# Patient Record
Sex: FEMALE | Race: OTHER | URBAN - METROPOLITAN AREA
[De-identification: names, ages, dates, MRNs, and addresses within clinical notes are randomized per-mention and may not be internally consistent; named-entity substitution may affect disease eponyms.]

---

## 2020-02-04 ENCOUNTER — EMERGENCY (EMERGENCY)
Facility: HOSPITAL | Age: 30
LOS: 1 days | Discharge: HOME | End: 2020-02-04
Attending: EMERGENCY MEDICINE | Admitting: EMERGENCY MEDICINE
Payer: COMMERCIAL

## 2020-02-04 VITALS
HEIGHT: 67 IN | DIASTOLIC BLOOD PRESSURE: 62 MMHG | WEIGHT: 134.92 LBS | HEART RATE: 111 BPM | TEMPERATURE: 98 F | SYSTOLIC BLOOD PRESSURE: 105 MMHG | RESPIRATION RATE: 18 BRPM

## 2020-02-04 VITALS
HEART RATE: 79 BPM | SYSTOLIC BLOOD PRESSURE: 109 MMHG | DIASTOLIC BLOOD PRESSURE: 65 MMHG | TEMPERATURE: 98 F | RESPIRATION RATE: 18 BRPM

## 2020-02-04 LAB
ALBUMIN SERPL ELPH-MCNC: 4.4 G/DL — SIGNIFICANT CHANGE UP (ref 3.3–5)
ALP SERPL-CCNC: 118 U/L — SIGNIFICANT CHANGE UP (ref 40–120)
ALT FLD-CCNC: 13 U/L — SIGNIFICANT CHANGE UP (ref 10–45)
ANION GAP SERPL CALC-SCNC: 14 MMOL/L — SIGNIFICANT CHANGE UP (ref 5–17)
APPEARANCE UR: CLEAR — SIGNIFICANT CHANGE UP
AST SERPL-CCNC: 15 U/L — SIGNIFICANT CHANGE UP (ref 10–40)
BASOPHILS # BLD AUTO: 0.04 K/UL — SIGNIFICANT CHANGE UP (ref 0–0.2)
BASOPHILS NFR BLD AUTO: 0.4 % — SIGNIFICANT CHANGE UP (ref 0–2)
BILIRUB SERPL-MCNC: 0.4 MG/DL — SIGNIFICANT CHANGE UP (ref 0.2–1.2)
BILIRUB UR-MCNC: NEGATIVE — SIGNIFICANT CHANGE UP
BUN SERPL-MCNC: 7 MG/DL — SIGNIFICANT CHANGE UP (ref 7–23)
CALCIUM SERPL-MCNC: 9.9 MG/DL — SIGNIFICANT CHANGE UP (ref 8.4–10.5)
CHLORIDE SERPL-SCNC: 102 MMOL/L — SIGNIFICANT CHANGE UP (ref 96–108)
CO2 SERPL-SCNC: 26 MMOL/L — SIGNIFICANT CHANGE UP (ref 22–31)
COLOR SPEC: YELLOW — SIGNIFICANT CHANGE UP
CREAT SERPL-MCNC: 0.52 MG/DL — SIGNIFICANT CHANGE UP (ref 0.5–1.3)
DIFF PNL FLD: ABNORMAL
EOSINOPHIL # BLD AUTO: 0.04 K/UL — SIGNIFICANT CHANGE UP (ref 0–0.5)
EOSINOPHIL NFR BLD AUTO: 0.4 % — SIGNIFICANT CHANGE UP (ref 0–6)
FLU A RESULT: SIGNIFICANT CHANGE UP
FLU A RESULT: SIGNIFICANT CHANGE UP
FLUAV AG NPH QL: SIGNIFICANT CHANGE UP
FLUBV AG NPH QL: SIGNIFICANT CHANGE UP
GLUCOSE SERPL-MCNC: 89 MG/DL — SIGNIFICANT CHANGE UP (ref 70–99)
GLUCOSE UR QL: NEGATIVE — SIGNIFICANT CHANGE UP
HCT VFR BLD CALC: 41.6 % — SIGNIFICANT CHANGE UP (ref 34.5–45)
HGB BLD-MCNC: 13.8 G/DL — SIGNIFICANT CHANGE UP (ref 11.5–15.5)
IMM GRANULOCYTES NFR BLD AUTO: 0.3 % — SIGNIFICANT CHANGE UP (ref 0–1.5)
KETONES UR-MCNC: ABNORMAL MG/DL
LEUKOCYTE ESTERASE UR-ACNC: NEGATIVE — SIGNIFICANT CHANGE UP
LYMPHOCYTES # BLD AUTO: 1.86 K/UL — SIGNIFICANT CHANGE UP (ref 1–3.3)
LYMPHOCYTES # BLD AUTO: 19.9 % — SIGNIFICANT CHANGE UP (ref 13–44)
MCHC RBC-ENTMCNC: 30.7 PG — SIGNIFICANT CHANGE UP (ref 27–34)
MCHC RBC-ENTMCNC: 33.2 GM/DL — SIGNIFICANT CHANGE UP (ref 32–36)
MCV RBC AUTO: 92.4 FL — SIGNIFICANT CHANGE UP (ref 80–100)
MONOCYTES # BLD AUTO: 0.59 K/UL — SIGNIFICANT CHANGE UP (ref 0–0.9)
MONOCYTES NFR BLD AUTO: 6.3 % — SIGNIFICANT CHANGE UP (ref 2–14)
NEUTROPHILS # BLD AUTO: 6.78 K/UL — SIGNIFICANT CHANGE UP (ref 1.8–7.4)
NEUTROPHILS NFR BLD AUTO: 72.7 % — SIGNIFICANT CHANGE UP (ref 43–77)
NITRITE UR-MCNC: NEGATIVE — SIGNIFICANT CHANGE UP
NRBC # BLD: 0 /100 WBCS — SIGNIFICANT CHANGE UP (ref 0–0)
PH UR: 6 — SIGNIFICANT CHANGE UP (ref 5–8)
PLATELET # BLD AUTO: 448 K/UL — HIGH (ref 150–400)
POTASSIUM SERPL-MCNC: 4.8 MMOL/L — SIGNIFICANT CHANGE UP (ref 3.5–5.3)
POTASSIUM SERPL-SCNC: 4.8 MMOL/L — SIGNIFICANT CHANGE UP (ref 3.5–5.3)
PROT SERPL-MCNC: 8.2 G/DL — SIGNIFICANT CHANGE UP (ref 6–8.3)
PROT UR-MCNC: ABNORMAL MG/DL
RBC # BLD: 4.5 M/UL — SIGNIFICANT CHANGE UP (ref 3.8–5.2)
RBC # FLD: 11.8 % — SIGNIFICANT CHANGE UP (ref 10.3–14.5)
RSV RESULT: DETECTED
RSV RNA RESP QL NAA+PROBE: DETECTED
SODIUM SERPL-SCNC: 142 MMOL/L — SIGNIFICANT CHANGE UP (ref 135–145)
SP GR SPEC: 1.02 — SIGNIFICANT CHANGE UP (ref 1–1.03)
UROBILINOGEN FLD QL: 1 E.U./DL — SIGNIFICANT CHANGE UP
WBC # BLD: 9.34 K/UL — SIGNIFICANT CHANGE UP (ref 3.8–10.5)
WBC # FLD AUTO: 9.34 K/UL — SIGNIFICANT CHANGE UP (ref 3.8–10.5)

## 2020-02-04 PROCEDURE — 99284 EMERGENCY DEPT VISIT MOD MDM: CPT | Mod: 25

## 2020-02-04 PROCEDURE — 96374 THER/PROPH/DIAG INJ IV PUSH: CPT

## 2020-02-04 PROCEDURE — 85025 COMPLETE CBC W/AUTO DIFF WBC: CPT

## 2020-02-04 PROCEDURE — 71046 X-RAY EXAM CHEST 2 VIEWS: CPT

## 2020-02-04 PROCEDURE — 87631 RESP VIRUS 3-5 TARGETS: CPT

## 2020-02-04 PROCEDURE — 81001 URINALYSIS AUTO W/SCOPE: CPT

## 2020-02-04 PROCEDURE — 80053 COMPREHEN METABOLIC PANEL: CPT

## 2020-02-04 PROCEDURE — 71046 X-RAY EXAM CHEST 2 VIEWS: CPT | Mod: 26

## 2020-02-04 RX ORDER — KETOROLAC TROMETHAMINE 30 MG/ML
15 SYRINGE (ML) INJECTION ONCE
Refills: 0 | Status: DISCONTINUED | OUTPATIENT
Start: 2020-02-04 | End: 2020-02-04

## 2020-02-04 RX ORDER — SODIUM CHLORIDE 9 MG/ML
1000 INJECTION INTRAMUSCULAR; INTRAVENOUS; SUBCUTANEOUS ONCE
Refills: 0 | Status: COMPLETED | OUTPATIENT
Start: 2020-02-04 | End: 2020-02-04

## 2020-02-04 RX ADMIN — Medication 15 MILLIGRAM(S): at 13:18

## 2020-02-04 RX ADMIN — SODIUM CHLORIDE 1000 MILLILITER(S): 9 INJECTION INTRAMUSCULAR; INTRAVENOUS; SUBCUTANEOUS at 13:20

## 2020-02-04 NOTE — ED ADULT NURSE NOTE - PMH
Injectable Influenza Immunization Documentation    1.  Is the person to be vaccinated sick today?   No    2. Does the person to be vaccinated have an allergy to a component   of the vaccine?   No  Egg Allergy Algorithm Link    3. Has the person to be vaccinated ever had a serious reaction   to influenza vaccine in the past?   No    4. Has the person to be vaccinated ever had Guillain-Barré syndrome?   No    Form completed by Sudhir Agarwal ma           
No pertinent past medical history

## 2020-02-04 NOTE — ED PROVIDER NOTE - PATIENT PORTAL LINK FT
You can access the FollowMyHealth Patient Portal offered by [facility] by registering at the following website: [email or web address]. By joining [facility]’s FollowMyHealth portal, you will also be able to view your health information using other applications (apps) compatible with our system.

## 2020-02-04 NOTE — ED ADULT TRIAGE NOTE - OTHER COMPLAINTS
Pt presents with complaints of cough, sob, migraines x 3 weeks. Pt states "I took a z pack and it has not helped". Pt endorses dark colored urine yesterday and is "concerned for my liver because I take acutane". Pt denies any fevers, no lightheadedness, no dizziness, no cp, no sob at this time, no n/v, no urinary complaints, no vaginal bleeding

## 2020-02-04 NOTE — ED ADULT NURSE NOTE - NSIMPLEMENTINTERV_GEN_ALL_ED
Implemented All Universal Safety Interventions:  [place] to call system. Call bell, personal items and telephone within reach. Instruct patient to call for assistance. Room bathroom lighting operational. Non-slip footwear when patient is off stretcher. Physically safe environment: no spills, clutter or unnecessary equipment. Stretcher in lowest position, wheels locked, appropriate side rails in place.

## 2020-02-04 NOTE — ED PROVIDER NOTE - CLINICAL SUMMARY MEDICAL DECISION MAKING FREE TEXT BOX
30 y/o healthy F presents ot the ED c/o 2 weeks of cough, congestion, fatigue, body aches, fever and chills; flu negative, + RSV.  CXR no infiltrate/effusion.  given IVF, toradol with improvement.  educated on supportive measures and d/c in stable condition discussed strict return parameters.  d/w attending

## 2020-02-04 NOTE — ED PROVIDER NOTE - ATTENDING CONTRIBUTION TO CARE
29F no PMH p/w several complaints. States that she has 3w of cough, fevers. Was in UC ~5d ago and prescribed z-pack. States that yesterday urine was darker but is normal now. She had a break of a few days w/o fevers but then they returned. Occasional nausea. Denies HA, SOB/CP, vomiting, diarrhea, abd pain, dysuria, rashes, focal weakness/numbness, LE pain/swelling, weight loss. Mild tachycardia, other vitals wnl. Exam as above.  In ED: Labs grossly wnl, RSV+, UA no infection, CXR no acute pathology.  Given IVF/symptom control. HR improved. Pt now feeling much better.  ddx: Likely URI.  Clinically no indication for further emergent ED workup or hospitalization at this time. Comfortable for dc, outpt f/u - discussed importance of f/u, especially given long duration of symptoms.

## 2020-02-04 NOTE — ED PROVIDER NOTE - NSFOLLOWUPINSTRUCTIONS_ED_ALL_ED_FT
Continue to hydrate with plenty of fluids.  Take tesslon tabs for cough and continue albuterol inhaler prescribed by urgent care.  Follow up with primary doctor within one week.  Return to ED for persistent fevers, difficulty breathing, chest pain, dizziness, fainting      Respiratory Syncytial Virus, Adult     Respiratory syncytial virus (RSV) is a common viral infection. It is caused by a virus that is similar to viruses that cause cold and flu symptoms. RSV can affect the nose, throat, and upper air passages (upper respiratory system) and the windpipe and lungs (lower respiratory system). If RSV affects the air passages in your lungs, you have bronchiolitis. If RSV affects your lungs, you have pneumonia.  RSV spreads from person to person (is contagious) through droplets from coughs and sneezes (respiratory secretions). RSV is rarely serious when it occurs in adults.  What are the causes?  An RSV infection is caused by the respiratory syncytial virus. When a sick person coughs or sneezes, there are particles of the virus in the droplets. You can get sick if you breathe in (inhale) those droplets. The virus can also survive for a while in droplets that land on surfaces. If you touch a contaminated surface and then touch your face, the virus can enter your body through your mouth, nose, or eyes. The virus also spreads through kissing, close contact, and shared eating or drinking utensils.  What increases the risk?  You may have a higher risk for RSV infection if:  You are 65 or older.You have a long-term (chronic) lung condition, such as COPD.You have a weakened disease-fighting system (immune system).You have Down syndrome.You have heart disease.You work in a hospital or other health care facility.You live in a long-term health care facility.What are the signs or symptoms?  Symptoms of RSV include:  Runny nose.Coughing. You may have a cough that brings up mucus (productive cough).Sneezing.Fever.Decreased appetite.Breathing loudly (wheezing).Shortness of breath.Fluid buildup in the lungs (respiratory distress).How is this diagnosed?  This condition may be diagnosed based on:  Your symptoms.Your medical history.A physical exam.A chest X-ray to rule out pneumonia.Blood tests or tests of mucus from your lungs (sputum). These tests may be done if you are older.A test of your respiratory secretions.How is this treated?  In most cases, the RSV infection will go away after 1–2 weeks of caring for yourself at home. If you have severe RSV and you develop pneumonia, you may need to be treated in the hospital with oxygen, antibiotic medicine, and medicines to open your breathing tubes (bronchodilators).  Follow these instructions at home:  Take over-the-counter and prescription medicines only as told by your health care provider.Drink enough fluid to keep your urine clear or pale yellow.Rest at home until your symptoms go away.Eat a healthy diet.Do not use any products that contain nicotine or tobacco, such as cigarettes and e-cigarettes. If you need help quitting, ask your health care provider.Keep all follow-up visits as told by your health care provider. This is important.How is this prevented?  To prevent catching and spreading the RSV virus:  Wash your hands often with soap and water. If soap and water are not available, use an alcohol-based hand . If you have not cleaned your hands, do not touch your face.If you have cold-like or flu-like symptoms, stay home.Cover your nose and mouth when you cough or sneeze.Avoid large groups of people.Keep a safe distance from people who are coughing and sneezing.Contact a health care provider if:  Your symptoms get worse.Your symptoms have not improved after 2 weeks.You have a fever.You have continuing (persistent) sweatiness, hot flashes, or chills.You cough up much more mucus than usual.You cough up blood.You feel very tired (are lethargic).You become confused.You have respiratory distress that gets worse.This information is not intended to replace advice given to you by your health care provider. Make sure you discuss any questions you have with your health care provider.

## 2020-02-04 NOTE — ED PROVIDER NOTE - OBJECTIVE STATEMENT
28 y/o healthy F presents ot the ED c/o 2 weeks of cough, congestion, fatigue, body aches, fever and chills. Pt went to Urgent Care on Thursday and was prescribed a Z-boone for bronchitis; reports completing her medication yesterday, but feels much worse. Pt states her dry cough is worsening with SOB, and is now experiencing dark brown urine, though denies dysuria. Pt takes Accutane and is mainly concerned of her liver function due to taking erythromycin. Pt is currently visiting from [place], otherwise denies recent travel. Denies leg pain/swelling, chest pain, abdominal pain, nausea or vomiting. 30 y/o healthy F presents ot the ED c/o 2 weeks of cough, congestion, fatigue, body aches, fever and chills. Pt went to Urgent Care on Thursday and was prescribed a Z-boone for bronchitis; reports completing her medication yesterday without relief. Pt states her dry cough is worsening with SOB w/ coughing, and is now experiencing dark brown urine, though denies dysuria. Pt takes Accutane and is mainly concerned of her liver function due to taking azithromycin.  Pt is currently visiting from [place], otherwise denies recent travel. Denies leg pain/swelling, chest pain, abdominal pain, nausea or vomiting.

## 2020-02-04 NOTE — ED PROVIDER NOTE - DIAGNOSTIC INTERPRETATION
ER PA: Laura Ballesteros  CHEST XRAY INTERPRETATION: lungs clear, heart shadow normal, bony structures intact

## 2020-02-04 NOTE — ED ADULT NURSE NOTE - CHIEF COMPLAINT QUOTE
Detail Level: Zone
cough, intermittent shortness of breath x 3 weeks.
Additional Note: Patient is reassured regarding the benign nature of this/these lesion(s).  Patient is encouraged to return for evaluation if lesion(s) grow, change, or becomes symptomatic.\\n
Include Location In Plan?: No

## 2020-02-04 NOTE — ED PROVIDER NOTE - PHYSICAL EXAMINATION
Vitals reviewed  Gen: fatigue appearing, nad, speaking in full sentences  Skin: wwp   HEENT: ncat, eomi, mmm, + nasal congestion, +post nasal drip, uvula is midline, no tonsillar erythema or exudates  CV: tachy, but regular rhythm, no audible m/r/g  Resp: ctab, no w/r/r  Abd: soft/nt  Ext: FROM throughout, no peripheral edema, no calf tenderness  Neuro: alert/oriented, no focal deficits, steady gait

## 2020-02-04 NOTE — ED ADULT NURSE NOTE - OBJECTIVE STATEMENT
PT presents complaining of body aches, congestion, cough for 2 weeks. Currently taking accutaine, given z pack and inhaler on thursday at urgent care with no improvement.

## 2020-02-10 DIAGNOSIS — R50.9 FEVER, UNSPECIFIED: ICD-10-CM

## 2020-02-10 DIAGNOSIS — Z88.1 ALLERGY STATUS TO OTHER ANTIBIOTIC AGENTS STATUS: ICD-10-CM

## 2020-02-10 DIAGNOSIS — R06.02 SHORTNESS OF BREATH: ICD-10-CM

## 2020-02-10 DIAGNOSIS — B97.4 RESPIRATORY SYNCYTIAL VIRUS AS THE CAUSE OF DISEASES CLASSIFIED ELSEWHERE: ICD-10-CM

## 2020-02-10 DIAGNOSIS — R09.81 NASAL CONGESTION: ICD-10-CM
